# Patient Record
Sex: MALE | Race: WHITE | NOT HISPANIC OR LATINO | Employment: FULL TIME | URBAN - METROPOLITAN AREA
[De-identification: names, ages, dates, MRNs, and addresses within clinical notes are randomized per-mention and may not be internally consistent; named-entity substitution may affect disease eponyms.]

---

## 2024-09-29 ENCOUNTER — OFFICE VISIT (OUTPATIENT)
Dept: URGENT CARE | Age: 29
End: 2024-09-29
Payer: COMMERCIAL

## 2024-09-29 VITALS
SYSTOLIC BLOOD PRESSURE: 128 MMHG | RESPIRATION RATE: 16 BRPM | DIASTOLIC BLOOD PRESSURE: 89 MMHG | HEART RATE: 68 BPM | TEMPERATURE: 98 F | OXYGEN SATURATION: 98 %

## 2024-09-29 DIAGNOSIS — L23.7 POISON IVY DERMATITIS: Primary | ICD-10-CM

## 2024-09-29 PROCEDURE — 99202 OFFICE O/P NEW SF 15 MIN: CPT | Performed by: NURSE PRACTITIONER

## 2024-09-29 PROCEDURE — 1036F TOBACCO NON-USER: CPT | Performed by: NURSE PRACTITIONER

## 2024-09-29 RX ORDER — BETAMETHASONE DIPROPIONATE 0.5 MG/G
LOTION TOPICAL 2 TIMES DAILY PRN
Qty: 60 ML | Refills: 0 | Status: SHIPPED | OUTPATIENT
Start: 2024-09-29 | End: 2025-01-27

## 2024-09-29 RX ORDER — PREDNISONE 5 MG/1
TABLET ORAL
Qty: 78 TABLET | Refills: 0 | Status: SHIPPED | OUTPATIENT
Start: 2024-09-29 | End: 2024-10-11

## 2024-09-29 ASSESSMENT — PAIN SCALES - GENERAL: PAINLEVEL: 0-NO PAIN

## 2024-09-29 NOTE — PROGRESS NOTES
Subjective   Patient ID: Brock Herrera is a 29 y.o. male. They present today with a chief complaint of Rash (Poison ivy on face, right arm, and left leg. Started on Wednesday.).    History of Present Illness  pOison ivy on the face, and bilateral arms and legs        Rash        Past Medical History  Allergies as of 09/29/2024    (No Known Allergies)       (Not in a hospital admission)       No past medical history on file.    No past surgical history on file.     reports that he has never smoked. He has never used smokeless tobacco. He reports current alcohol use. He reports that he does not use drugs.    Review of Systems  Review of Systems   Skin:  Positive for rash.                                  Objective    Vitals:    09/29/24 1232   BP: 128/89   Pulse: 68   Resp: 16   Temp: 36.7 °C (98 °F)   TempSrc: Oral   SpO2: 98%     No LMP for male patient.    Physical Exam  Vitals reviewed.   Constitutional:       Appearance: Normal appearance.   Pulmonary:      Effort: Pulmonary effort is normal.   Skin:     Findings: Rash present. Rash is macular, papular, pustular and urticarial.          Neurological:      Mental Status: He is alert.         Procedures    Point of Care Test & Imaging Results from this visit  No results found for this visit on 09/29/24.   No results found.    Diagnostic study results (if any) were reviewed by Gaviota Flores.    Assessment/Plan   Allergies, medications, history, and pertinent labs/EKGs/Imaging reviewed by Gaviota Flores.     Medical Decision Making  - start steroid cream topically  Add OTC antihistamine  - startoral steroid    Orders and Diagnoses  Encounter Diagnosis   Name Primary?    Poison ivy dermatitis Yes       Medical Admin Record      Patient disposition: Home    Electronically signed by Gaviota Flores  12:51 PM

## 2024-10-09 ENCOUNTER — OFFICE VISIT (OUTPATIENT)
Dept: URGENT CARE | Age: 29
End: 2024-10-09
Payer: COMMERCIAL

## 2024-10-09 VITALS
RESPIRATION RATE: 16 BRPM | SYSTOLIC BLOOD PRESSURE: 148 MMHG | BODY MASS INDEX: 24.01 KG/M2 | DIASTOLIC BLOOD PRESSURE: 93 MMHG | TEMPERATURE: 98 F | HEART RATE: 78 BPM | OXYGEN SATURATION: 98 % | WEIGHT: 182 LBS

## 2024-10-09 DIAGNOSIS — L23.7 POISON IVY DERMATITIS: Primary | ICD-10-CM

## 2024-10-09 RX ORDER — PREDNISONE 5 MG/1
TABLET ORAL
Qty: 78 TABLET | Refills: 0 | Status: SHIPPED | OUTPATIENT
Start: 2024-10-09 | End: 2024-10-21

## 2024-10-09 NOTE — PATIENT INSTRUCTIONS
Please call for a follow up appointment with:     [facility]      [address]   [place] [address]    Phone: [phone]    Schedule appointment online: [employer]

## 2024-10-09 NOTE — PROGRESS NOTES
Chief Complaint   Patient presents with    Rash     Was seen 2 weeks ago for poison ivy        Physical Exam:     Diffuse vesicular rash       POC Labs:     No visits with results within 2 Day(s) from this visit.   Latest known visit with results is:   No results found for any previous visit.        Encounter Diagnosis   Name Primary?    Poison ivy dermatitis Yes        Plan:     Prednisone     F/U [facility]     Anna Hercules, DO